# Patient Record
Sex: FEMALE | Race: WHITE
[De-identification: names, ages, dates, MRNs, and addresses within clinical notes are randomized per-mention and may not be internally consistent; named-entity substitution may affect disease eponyms.]

---

## 2019-03-07 ENCOUNTER — HOSPITAL ENCOUNTER (OUTPATIENT)
Dept: HOSPITAL 25 - OR | Age: 64
Discharge: HOME | End: 2019-03-07
Attending: ORTHOPAEDIC SURGERY
Payer: COMMERCIAL

## 2019-03-07 VITALS — DIASTOLIC BLOOD PRESSURE: 81 MMHG | SYSTOLIC BLOOD PRESSURE: 171 MMHG

## 2019-03-07 DIAGNOSIS — L08.89: ICD-10-CM
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
## 2019-03-07 DIAGNOSIS — S91.342A: Primary | ICD-10-CM

## 2019-03-07 DIAGNOSIS — E78.5: ICD-10-CM

## 2019-03-07 DIAGNOSIS — Z79.84: ICD-10-CM

## 2019-03-07 DIAGNOSIS — E11.9: ICD-10-CM
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
## 2019-03-07 PROCEDURE — 87077 CULTURE AEROBIC IDENTIFY: CPT

## 2019-03-07 PROCEDURE — 88300 SURGICAL PATH GROSS: CPT

## 2019-03-07 PROCEDURE — 87070 CULTURE OTHR SPECIMN AEROBIC: CPT

## 2019-03-07 PROCEDURE — 87186 SC STD MICRODIL/AGAR DIL: CPT

## 2019-03-07 PROCEDURE — 36415 COLL VENOUS BLD VENIPUNCTURE: CPT

## 2019-03-07 PROCEDURE — 87205 SMEAR GRAM STAIN: CPT

## 2019-03-07 PROCEDURE — 87073 CULTURE BACTERIA ANAEROBIC: CPT

## 2019-03-07 PROCEDURE — 86803 HEPATITIS C AB TEST: CPT

## 2019-03-07 NOTE — OP
Operative Report - Blank





- Operative Report


Date of Operation: 03/07/19


Note: 


PATIENT: Debi Scott





YOB: 1955





DATE OF SURGERY: 3/7/2019





SURGEON: Gilson Mccoy MD





ASSISTANT: RENE Madrid, whos assistance was necessary for positioning, 

retraction, help with instrumentation, and closure.





ANESTHESIOLOGIST: Dr. Graves





PREOPERATIVE DIAGNOSIS: Left foot infection and retained foreign body





POSTOPERATIVE DIAGNOSIS: Left foot infection and retained foreign body





OPERATION: 


1. Left foot removal of foreign body


2. Left foot irrigation and debridement





ANESTHESIA: MAC





IMPLANTS: none





TOURNIQUET TIME: Less than 30 minutes with an ankle esmarch tourniquet





SPECIMENS: Splinter to pathology. Culture swabs to micro.





ESTIMATED BLOOD LOSS: minimal





COMPLICATIONS: none





STATUS: Stable from the operating room to the recovery room and then home





INDICATIONS FOR PROCEDURE:


Debi has had recurrent left foot infections due to a retained foreign body.  

Both operative and non-operative treatment alternatives were reviewed. Further, 

the nature and risks of surgery were reviewed in careful detail. Our 

discussions regarding the risks of surgery included, but were not limited to, 

persistent or worsening infection, persistent foreign body, wound problems, 

nerve injury, neuroma, RSD, persistent symptoms, blood clot, need for further 

surgery, failure of the surgery, and even the remote chance of catastrophic 

complication.





DESCRIPTION OF PROCEDURE:


The patient was seen in the preoperative holding unit and informed written 

consent was obtained.  The appropriate extremity was marked. The patient was 

then brought to the operating room and carefully positioned on the operating 

room table. Anesthesia was induced. All bony prominences were padded with great 

care. A chlorhexidine based pre-scrub was performed followed by a chloraprep 

prep and drape in standard sterile fashion. A surgical safety pause was then 

conducted in which we confirmed the appropriate patient, extremity, planned 

procedure, availability of equipment, indication and administration of 

prophylactic antibiotics, and DVT prophylaxis in the form of a compression boot 

on the non-surgical extremity. 





I used the ultrasound in the OR to localize what I thought was the foreign 

body.  This was at the central aspect of the erythematous area on the medial 

left forefoot.  I then made a longitudinal incision centered on this spot.  I 

then carefully dissected down through the subcutaneous tissue, fascia, and into 

the deep muscle of the medial forefoot.  The splinter, which was about 4 mm in 

length, was then encountered.  It was excised and sent to pathology.  There was 

also a surrounding pocket of pus.  Culture swabs were sent from this.  I then 

sharply excised any infected-appearing tissue using a 15 blade scalpel.  

Remaining tissue appeared healthy and viable, so the wound was then copiously 

irrigated with sterile saline.  The tourniquet was then released and meticulous 

hemostasis obtained. The wound was then closed in layers utilizing 3-0 Monocryl 

and 3-0 nylon. A sterile dressing was then applied. 





The patient was then awakened from anesthesia and transferred to the recovery 

room in stable condition.  There were no complications.  All needle and sponge 

counts were correct at the end of the case.





ATTESTATION: I attest I was present and scrubbed and performed the critical 

portions of the procedure myself.





POSTOPERATIVE PLAN: She will follow up in 2 weeks for suture removal.